# Patient Record
Sex: FEMALE | Employment: FULL TIME | URBAN - METROPOLITAN AREA
[De-identification: names, ages, dates, MRNs, and addresses within clinical notes are randomized per-mention and may not be internally consistent; named-entity substitution may affect disease eponyms.]

---

## 2019-02-27 ENCOUNTER — TELEPHONE (OUTPATIENT)
Dept: OTOLARYNGOLOGY | Facility: CLINIC | Age: 64
End: 2019-02-27

## 2019-02-27 ENCOUNTER — HOSPITAL ENCOUNTER (OUTPATIENT)
Dept: RADIOLOGY | Facility: HOSPITAL | Age: 64
Discharge: HOME | End: 2019-02-27
Attending: PHYSICIAN ASSISTANT
Payer: MEDICARE

## 2019-02-27 ENCOUNTER — OFFICE VISIT (OUTPATIENT)
Dept: OTOLARYNGOLOGY | Facility: CLINIC | Age: 64
End: 2019-02-27
Payer: MEDICARE

## 2019-02-27 VITALS — WEIGHT: 139.13 LBS | DIASTOLIC BLOOD PRESSURE: 76 MMHG | SYSTOLIC BLOOD PRESSURE: 121 MMHG | HEART RATE: 97 BPM

## 2019-02-27 DIAGNOSIS — J34.89 SINUS PRESSURE: ICD-10-CM

## 2019-02-27 DIAGNOSIS — R51.9 NONINTRACTABLE HEADACHE, UNSPECIFIED CHRONICITY PATTERN, UNSPECIFIED HEADACHE TYPE: ICD-10-CM

## 2019-02-27 DIAGNOSIS — J34.89 SINUS PRESSURE: Primary | ICD-10-CM

## 2019-02-27 DIAGNOSIS — R42 DIZZINESS: ICD-10-CM

## 2019-02-27 PROCEDURE — 99204 OFFICE O/P NEW MOD 45 MIN: CPT | Mod: S$GLB,,, | Performed by: PHYSICIAN ASSISTANT

## 2019-02-27 PROCEDURE — 70220 X-RAY EXAM OF SINUSES: CPT | Mod: 26,,, | Performed by: RADIOLOGY

## 2019-02-27 PROCEDURE — 99999 PR PBB SHADOW E&M-NEW PATIENT-LVL III: ICD-10-PCS | Mod: PBBFAC,,, | Performed by: PHYSICIAN ASSISTANT

## 2019-02-27 PROCEDURE — 99204 PR OFFICE/OUTPT VISIT, NEW, LEVL IV, 45-59 MIN: ICD-10-PCS | Mod: S$GLB,,, | Performed by: PHYSICIAN ASSISTANT

## 2019-02-27 PROCEDURE — 70220 XR SINUSES MIN 3 VIEWS: ICD-10-PCS | Mod: 26,,, | Performed by: RADIOLOGY

## 2019-02-27 PROCEDURE — 70220 X-RAY EXAM OF SINUSES: CPT | Mod: TC

## 2019-02-27 PROCEDURE — 99999 PR PBB SHADOW E&M-NEW PATIENT-LVL III: CPT | Mod: PBBFAC,,, | Performed by: PHYSICIAN ASSISTANT

## 2019-02-27 RX ORDER — MECLIZINE HYDROCHLORIDE 25 MG/1
TABLET ORAL
Refills: 0 | COMMUNITY
Start: 2019-02-18 | End: 2019-02-27

## 2019-02-27 NOTE — TELEPHONE ENCOUNTER
Spoke with pt and reviewed her Xray results also informed her that she doesn't need Antibiotics and to keep the VNG that is scheduled for Monday at [facility]. Pt verbalized understanding.

## 2019-02-27 NOTE — TELEPHONE ENCOUNTER
Please let patient know that her sinus xrays are clear.  She does not need antibiotics.  Recommend dizzy testing as we discussed.

## 2019-02-28 NOTE — PROGRESS NOTES
Subjective:       Patient ID: Nette Eddy is a 63 y.o. female.    Chief Complaint: Dizziness    Patient is a very pleasant 63 y.o. female here to see me today for the first time for evaluation of dizziness.   She reports that the symptoms have been present for the last 4-6 weeks.  On average, the patent reports symptoms that occur approximately several times each day.  She describes the dizziness as a sensation of unsteadiness and heaviness and feels as though she's going to fall if walking too far.  She denies any spinning.  She says her head feels heavy and she notes pressure-like headaches (band-like) with occasional sharp shooting pains in temple and forehead.  Denies visual changes.  Denies nasal drainage or congestion.  She says that it lasts minutes to hours.  She has noted that standing or walking acts as a trigger.  She denies aural pressure, otalgia, otorrhea, tinnitus and hearing loss.  She has not started any new medications, and has not had any recent dietary changes.  She thought she may be anemic so resumed molasses.  Denies previous otologic surgery; denies family hx of hearing loss; denies loud noise exposure.  She took old Rx for Amoxil (3 doses) this week.  She did have root canal last month and has since followed up with dentist who said that tooth is fine.  No fever.  She was seen at  and told she had fluid in her ear and prescribed Meclizine (caused drowsiness).  She has tried nasal sprays in the past but says she does not tolerate them.        Review of Systems   Constitutional: Positive for fatigue. Negative for activity change, appetite change and fever.   HENT: Positive for dental problem (root canal last month) and postnasal drip. Negative for congestion, ear discharge, ear pain, nosebleeds, rhinorrhea, sinus pressure, sinus pain, sneezing, sore throat and tinnitus.    Eyes: Negative for discharge and visual disturbance.   Respiratory: Negative for cough and shortness of breath.     Cardiovascular: Negative for chest pain and palpitations.   Gastrointestinal: Negative for diarrhea, nausea and vomiting.   Musculoskeletal: Negative for gait problem.   Allergic/Immunologic: Negative for food allergies.   Neurological: Positive for dizziness and headaches (pressure, heaviness). Negative for syncope, speech difficulty and light-headedness.   Hematological: Negative for adenopathy.   Psychiatric/Behavioral: Negative for confusion.       Objective:      Physical Exam   Constitutional: She is oriented to person, place, and time. She appears well-developed and well-nourished. She is cooperative. No distress.   HENT:   Head: Normocephalic and atraumatic.   Right Ear: Tympanic membrane, external ear and ear canal normal. Tympanic membrane is not erythematous. No middle ear effusion.   Left Ear: Tympanic membrane, external ear and ear canal normal. Tympanic membrane is not erythematous.  No middle ear effusion.   Nose: Nose normal. No mucosal edema, rhinorrhea, nasal deformity or septal deviation. No epistaxis. Right sinus exhibits no maxillary sinus tenderness and no frontal sinus tenderness. Left sinus exhibits no maxillary sinus tenderness and no frontal sinus tenderness.   Mouth/Throat: Uvula is midline, oropharynx is clear and moist and mucous membranes are normal. Mucous membranes are not pale and not dry. No trismus in the jaw. Normal dentition. No uvula swelling. No oropharyngeal exudate or posterior oropharyngeal erythema.   Eyes: Conjunctivae, EOM and lids are normal. Pupils are equal, round, and reactive to light. Right eye exhibits no chemosis. Left eye exhibits no chemosis. Right conjunctiva is not injected. Left conjunctiva is not injected. No scleral icterus. Right eye exhibits normal extraocular motion and no nystagmus. Left eye exhibits normal extraocular motion and no nystagmus.   Neck: Trachea normal and phonation normal. No tracheal tenderness present. No tracheal deviation present.  No thyroid mass and no thyromegaly present.   Cardiovascular: Intact distal pulses.   Pulmonary/Chest: Effort normal. No stridor. No respiratory distress.   Abdominal: She exhibits no distension.   Lymphadenopathy:        Head (right side): No submental, no submandibular, no preauricular and no posterior auricular adenopathy present.        Head (left side): No submental, no submandibular, no preauricular and no posterior auricular adenopathy present.     She has no cervical adenopathy.   Neurological: She is alert and oriented to person, place, and time. No cranial nerve deficit.   Skin: Skin is warm and dry. No rash noted. No erythema.   Psychiatric: She has a normal mood and affect. Her behavior is normal.         Tuning fork exam:  Paez does not lateralize  Rinne AC > BC AU      Assessment:       1. Sinus pressure    2. Nonintractable headache, unspecified chronicity pattern, unspecified headache type    3. Dizziness        Plan:           Dizziness:  I had a long discussion with the patient regarding their symptoms.  Dizziness, vertigo and disequilibrium are common symptoms reported by adults during visits to their doctors. They are all symptoms that can result from a peripheral vestibular disorder (a dysfunction of the balance organs of the inner ear) or central vestibular disorder (a dysfunction of one or more parts of the central nervous system that help process balance and spatial information).  There are also non-vestibular causes of dizziness, and dizziness can be linked to a wide array of problems such as blood-flow irregularities from cardiovascular problems and blood pressure fluctuations.  I would recommend a VNG with audiogram for further diagnostic testing, and will contact the patient with further recommendations when that is complete.    Sinus pressure, headaches:  Reassured patient that I do not see middle ear fluid on today's exam and her tuning fork exam is reassuring as well.  Recommend sinus  xrays for further evaluation and will call her with these results.  I'm not convinced her pressure and heaviness in her head is related to sinusitis as she has no nasal drainage or sinus pain, fever.  If xrays are negative and VNG is normal, recommend she return to PCP for further workup.

## 2019-03-04 ENCOUNTER — CLINICAL SUPPORT (OUTPATIENT)
Dept: AUDIOLOGY | Facility: CLINIC | Age: 64
End: 2019-03-04
Payer: MEDICARE

## 2019-03-04 DIAGNOSIS — R42 DIZZINESS: Primary | ICD-10-CM

## 2019-03-04 DIAGNOSIS — H90.3 SENSORY HEARING LOSS, BILATERAL: Primary | ICD-10-CM

## 2019-03-04 PROCEDURE — 92567 PR TYMPA2METRY: ICD-10-PCS | Mod: S$GLB,,, | Performed by: AUDIOLOGIST

## 2019-03-04 PROCEDURE — 92537 CALORIC VSTBLR TEST W/REC: CPT | Mod: S$GLB,,, | Performed by: AUDIOLOGIST-HEARING AID FITTER

## 2019-03-04 PROCEDURE — 92537 PR CALORIC VSTBLR TEST W/REC BITHERMAL: ICD-10-PCS | Mod: S$GLB,,, | Performed by: AUDIOLOGIST-HEARING AID FITTER

## 2019-03-04 PROCEDURE — 92540 PR VESTIBULAR EVAL NYSTAG FOVL&PERPH STIM OSCIL TRACKING: ICD-10-PCS | Mod: S$GLB,,, | Performed by: AUDIOLOGIST-HEARING AID FITTER

## 2019-03-04 PROCEDURE — 92540 BASIC VESTIBULAR EVALUATION: CPT | Mod: S$GLB,,, | Performed by: AUDIOLOGIST-HEARING AID FITTER

## 2019-03-04 PROCEDURE — 92557 PR COMPREHENSIVE HEARING TEST: ICD-10-PCS | Mod: S$GLB,,, | Performed by: AUDIOLOGIST

## 2019-03-04 PROCEDURE — 92567 TYMPANOMETRY: CPT | Mod: S$GLB,,, | Performed by: AUDIOLOGIST

## 2019-03-04 PROCEDURE — 92557 COMPREHENSIVE HEARING TEST: CPT | Mod: S$GLB,,, | Performed by: AUDIOLOGIST

## 2019-03-04 NOTE — PROGRESS NOTES
"Referring provider: LUIGI Nix OPAL Eddy was seen 03/04/2019 for a vestibular evaluation.  Patient reports dizziness and imbalance is better since her last ENT visit.  She reports some imbalance with rapid head movements at times.    She was having pressure around her head and cheeks with headache.  Pressure and headache resolved after taking OTC Sudafed.  Patient denies history of migraine.  She notes after eating large quantity she sometimes gets dizzy and headache.  She has not identified symptoms after eating specific types of food but more so occurs after eating too much.  She uses salt in diet; does not drink caffeine beverages.     Videonystagmography Report (VNG):  Oculomotor function tests:  1. Sinusoidal tracking was abnormal.  Gain and accuracy was reduced at all test frequencies.  2. Saccade revealed normal latencies, velocities and accuracies.  3. Optokinetic test was normal and symmetric.  Spontaneous test was absent for nystagmus.  Gaze test was absent for nystagmus.  Head-shake test was absent for after head-shake nystagmus.  Static Positional test was absent for nystagmus.  Tanya-Hallpike Right was negative for BPPV.  Tanya-Hallpike Left was negative for BPPV.  Bi-thermal caloric test was Normal.  Fixation suppression following caloric irrigations was normal.  The following values were obtained:  Unilateral weakness (UW): 13% right ear  Directional preponderance (DP): 15% left beating  RC: 16 d/s  LC:  15 d/s  RW: 22 d/s  LW: 34 d/s     Tympanogram re-test:  Right ear: Type "A"  .27 ml @ -81 daPa.  ECV: .85 ml  Left ear: Type "A"  .38 ml @ -20 daPa.  ECV: .81 ml    Summary: Normal VNG.    Recommendations:  1. ENT review.      Patient was counseled on the above findings.  Tracings are to be scanned.  "

## 2019-03-04 NOTE — PROGRESS NOTES
Nette Eddy was seen 03/05/2019 for an audiological evaluation prior to VNG.  See VNG report for vestibular symptoms.  Pt denies hearing loss and tinnitus, as well as history of noise exposure or family history of hearing loss.    Results reveal a mild sensorineural hearing loss at 8000 Hz for the right ear, and  moderate sensorineural hearing loss at 8000 Hz for the left ear.   Speech Reception Thresholds were  5 dBHL for the right ear and 0 dBHL for the left ear.   Word recognition scores were excellent for the right ear and excellent for the left ear.   Tympanograms were Type A for the right ear and Type A for the left ear.    Patient was counseled on the above findings.    REC:  ENT review of audiogram

## 2019-03-06 ENCOUNTER — TELEPHONE (OUTPATIENT)
Dept: OTOLARYNGOLOGY | Facility: CLINIC | Age: 64
End: 2019-03-06

## 2019-03-06 NOTE — TELEPHONE ENCOUNTER
Please let patient know her recent VNG was normal and did NOT show that her inner ear is the cause of her dizziness.  Her tympanograms were normal as well indicating no fluid in either ear.  Recommend she return to PCP if dizziness persists.

## 2020-06-11 ENCOUNTER — LAB VISIT (OUTPATIENT)
Dept: PRIMARY CARE CLINIC | Facility: OTHER | Age: 65
End: 2020-06-11
Payer: MEDICARE

## 2020-06-11 DIAGNOSIS — Z11.59 SCREENING FOR VIRAL DISEASE: Primary | ICD-10-CM

## 2020-06-11 PROCEDURE — U0003 INFECTIOUS AGENT DETECTION BY NUCLEIC ACID (DNA OR RNA); SEVERE ACUTE RESPIRATORY SYNDROME CORONAVIRUS 2 (SARS-COV-2) (CORONAVIRUS DISEASE [COVID-19]), AMPLIFIED PROBE TECHNIQUE, MAKING USE OF HIGH THROUGHPUT TECHNOLOGIES AS DESCRIBED BY CMS-2020-01-R: HCPCS

## 2020-06-12 LAB — SARS-COV-2 RNA RESP QL NAA+PROBE: NOT DETECTED
